# Patient Record
Sex: MALE | Race: WHITE | Employment: FULL TIME | URBAN - METROPOLITAN AREA
[De-identification: names, ages, dates, MRNs, and addresses within clinical notes are randomized per-mention and may not be internally consistent; named-entity substitution may affect disease eponyms.]

---

## 2017-01-05 ENCOUNTER — OFFICE VISIT (OUTPATIENT)
Dept: NEPHROLOGY | Facility: CLINIC | Age: 42
End: 2017-01-05

## 2017-01-05 VITALS
WEIGHT: 190.38 LBS | HEART RATE: 78 BPM | SYSTOLIC BLOOD PRESSURE: 120 MMHG | HEIGHT: 71 IN | BODY MASS INDEX: 26.65 KG/M2 | DIASTOLIC BLOOD PRESSURE: 79 MMHG

## 2017-01-05 DIAGNOSIS — Z00.00 HEALTHCARE MAINTENANCE: Primary | ICD-10-CM

## 2017-01-05 PROCEDURE — 99396 PREV VISIT EST AGE 40-64: CPT | Performed by: INTERNAL MEDICINE

## 2017-01-05 RX ORDER — TADALAFIL 20 MG/1
20 TABLET ORAL
Qty: 8 TABLET | Refills: 3 | Status: SHIPPED | OUTPATIENT
Start: 2017-01-05

## 2017-01-05 RX ORDER — CITALOPRAM 20 MG/1
20 TABLET ORAL
Qty: 90 TABLET | Refills: 3 | Status: SHIPPED | OUTPATIENT
Start: 2017-01-05 | End: 2018-01-29

## 2017-01-09 RX ORDER — TADALAFIL 20 MG
TABLET ORAL
Qty: 8 TABLET | Refills: 0 | Status: SHIPPED | OUTPATIENT
Start: 2017-01-09 | End: 2017-03-10

## 2017-01-09 NOTE — PROGRESS NOTES
NEPHROLOGY PROGRESS NOTE  Ewa Gutierrez    MRN:  [id number]   Date of Service:  1/5/17     HISTORY OF PRESENT ILLNESS: The patient is here. He is a [employer]IN[employer]  in [place] [address]. He is currently doing fine.  His depression has gotten better with citalop

## 2017-01-13 ENCOUNTER — TELEPHONE (OUTPATIENT)
Dept: NEPHROLOGY | Facility: CLINIC | Age: 42
End: 2017-01-13

## 2017-01-13 PROBLEM — N52.9 ERECTILE DYSFUNCTION: Status: ACTIVE | Noted: 2017-01-13

## 2017-01-13 NOTE — TELEPHONE ENCOUNTER
Spoke to patient and was informed that cialis prescription has been rejected at [facility]. [address] [place] [address] desk at [phone] to initiate a plan exclusion form (as of 1/1/17 Cialis is not covered.  Forms are being faxed to the office and a amos

## 2017-01-19 ENCOUNTER — TELEPHONE (OUTPATIENT)
Dept: NEPHROLOGY | Facility: CLINIC | Age: 42
End: 2017-01-19

## 2017-01-23 ENCOUNTER — TELEPHONE (OUTPATIENT)
Dept: NEPHROLOGY | Facility: CLINIC | Age: 42
End: 2017-01-23

## 2017-01-24 NOTE — TELEPHONE ENCOUNTER
Patient contacted and advised that his company cancelled benefits for any of these medications and patient would need to pay out of pocket if he wants to use them.

## 2017-03-04 ENCOUNTER — PATIENT MESSAGE (OUTPATIENT)
Dept: NEPHROLOGY | Facility: CLINIC | Age: 42
End: 2017-03-04

## 2017-03-06 NOTE — TELEPHONE ENCOUNTER
From: Xiomara Soliz  To: Talib Alfonso MD  Sent: 3/4/2017 11:45 AM CST  Subject: Prescription Question    Hi Dr. Sukhwinder Edwards I finally got onto MyChart.  My wonderful insurance company decided to no longer cover Cialis for ED as a result of my other medication

## 2017-03-08 RX ORDER — TADALAFIL 20 MG/1
20 TABLET ORAL
Qty: 30 TABLET | Refills: 11 | Status: SHIPPED | OUTPATIENT
Start: 2017-03-08 | End: 2017-03-09

## 2017-03-09 ENCOUNTER — TELEPHONE (OUTPATIENT)
Dept: NEPHROLOGY | Facility: CLINIC | Age: 42
End: 2017-03-09

## 2017-03-09 RX ORDER — TADALAFIL 20 MG/1
20 TABLET ORAL
Qty: 30 TABLET | Refills: 11 | Status: SHIPPED | OUTPATIENT
Start: 2017-03-09

## 2017-03-10 ENCOUNTER — OFFICE VISIT (OUTPATIENT)
Dept: OTOLARYNGOLOGY | Facility: CLINIC | Age: 42
End: 2017-03-10

## 2017-03-10 VITALS
DIASTOLIC BLOOD PRESSURE: 82 MMHG | BODY MASS INDEX: 24.5 KG/M2 | SYSTOLIC BLOOD PRESSURE: 125 MMHG | HEIGHT: 71 IN | TEMPERATURE: 98 F | WEIGHT: 175 LBS

## 2017-03-10 DIAGNOSIS — H61.21 IMPACTED CERUMEN OF RIGHT EAR: Primary | ICD-10-CM

## 2017-03-10 PROCEDURE — 69210 REMOVE IMPACTED EAR WAX UNI: CPT | Performed by: OTOLARYNGOLOGY

## 2017-03-10 PROCEDURE — 99243 OFF/OP CNSLTJ NEW/EST LOW 30: CPT | Performed by: OTOLARYNGOLOGY

## 2017-03-11 NOTE — PATIENT INSTRUCTIONS
Earwax Removal    The ear canal makes earwax from the canal’s lining. The ears make wax to lubricate and protect the ear canal. The ear canal is the tube that connects the middle ear to the outside of the ear.  The wax protects the ear from bacteria, infe · Don’t use cotton swabs in your ears. Cotton swabs may push wax deeper into the ear canal or damage the eardrum.  Use cotton gauze or a wet washcloth  to gently remove wax on the outside of the ear and around the opening to the ear canal.  · Don't use any © 9041-4870 [address], [address] [place]. All rights reserved. This information is not intended as a substitute for professional medical care. Always follow your healthcare professional's instructions.

## 2017-03-11 NOTE — PROGRESS NOTES
Xiomara Soliz is a 40year old male. Patient presents with:  Ear Wax: bilateral ear cleaning     HPI:   He's had an unusual feeling in his right ear with some blockage and occasional pain.  He has no history of ear infection, surgery, injury, and was exposur Appropriate mood and affect. Lymph Detail Normal Submental. Submandibular. Anterior cervical. Posterior cervical. Supraclavicular.    Eyes Normal Conjunctiva - Right: Normal, Left: Normal. Pupil - Right: Normal, Left: Normal.    Ears Normal Inspection - R

## 2017-03-20 RX ORDER — CITALOPRAM 20 MG/1
TABLET ORAL
Qty: 90 TABLET | Refills: 1 | Status: SHIPPED | OUTPATIENT
Start: 2017-03-20 | End: 2020-03-11

## 2018-01-29 RX ORDER — CITALOPRAM 20 MG/1
TABLET ORAL
Qty: 30 TABLET | Refills: 0 | Status: SHIPPED | OUTPATIENT
Start: 2018-01-29 | End: 2018-04-02

## 2018-04-03 RX ORDER — CITALOPRAM 20 MG/1
TABLET ORAL
Qty: 30 TABLET | Refills: 0 | Status: SHIPPED | OUTPATIENT
Start: 2018-04-03 | End: 2018-06-02

## 2018-06-04 RX ORDER — CITALOPRAM 20 MG/1
TABLET ORAL
Qty: 30 TABLET | Refills: 5 | Status: SHIPPED | OUTPATIENT
Start: 2018-06-04 | End: 2019-03-28

## 2019-03-29 RX ORDER — CITALOPRAM 20 MG/1
TABLET ORAL
Qty: 30 TABLET | Refills: 0 | Status: SHIPPED | OUTPATIENT
Start: 2019-03-29 | End: 2019-05-10

## 2019-05-10 RX ORDER — CITALOPRAM 20 MG/1
TABLET ORAL
Qty: 30 TABLET | Refills: 5 | Status: SHIPPED | OUTPATIENT
Start: 2019-05-10 | End: 2020-03-11

## 2020-03-11 ENCOUNTER — PATIENT MESSAGE (OUTPATIENT)
Dept: NEPHROLOGY | Facility: CLINIC | Age: 45
End: 2020-03-11

## 2020-03-11 RX ORDER — CITALOPRAM 20 MG/1
20 TABLET ORAL DAILY
Qty: 90 TABLET | Refills: 1 | OUTPATIENT
Start: 2020-03-11

## 2020-03-11 RX ORDER — CITALOPRAM 20 MG/1
20 TABLET ORAL DAILY
Qty: 30 TABLET | Refills: 0 | Status: SHIPPED | OUTPATIENT
Start: 2020-03-11 | End: 2020-04-16

## 2020-03-11 RX ORDER — CITALOPRAM 20 MG/1
20 TABLET ORAL DAILY
Qty: 30 TABLET | Refills: 0 | OUTPATIENT
Start: 2020-03-11

## 2020-03-11 NOTE — TELEPHONE ENCOUNTER
From: Alba Chavarria  To: Ailin Byrd MD  Sent: 3/11/2020 3:46 PM CDT  Subject: Prescription Question    Hi Dr Alethea Johnson cannot re-fill my prescription it says because my Dr's office needs to approve my re-fill.  Its for citalorpram (no clue if

## 2020-04-16 RX ORDER — CITALOPRAM 20 MG/1
20 TABLET ORAL DAILY
Qty: 30 TABLET | Refills: 5 | Status: SHIPPED | OUTPATIENT
Start: 2020-04-16 | End: 2020-12-24

## 2020-04-16 NOTE — TELEPHONE ENCOUNTER
Pharmacy called for refill:     Current Outpatient Medications:   •  Citalopram Hydrobromide 20 MG Oral Tab, Take 1 tablet (20 mg total) by mouth daily. , Disp: 30 tablet, Rfl: 0    Please call.

## 2020-12-24 RX ORDER — CITALOPRAM 20 MG/1
20 TABLET ORAL DAILY
Qty: 30 TABLET | Refills: 5 | Status: SHIPPED | OUTPATIENT
Start: 2020-12-24 | End: 2021-07-28

## 2020-12-24 NOTE — TELEPHONE ENCOUNTER
.  Current Outpatient Medications:   •  Citalopram Hydrobromide 20 MG Oral Tab, Take 1 tablet (20 mg total) by mouth daily. , Disp: 30 tablet, Rfl: 5

## 2021-07-28 ENCOUNTER — TELEPHONE (OUTPATIENT)
Dept: NEPHROLOGY | Facility: CLINIC | Age: 46
End: 2021-07-28

## 2021-07-28 RX ORDER — CITALOPRAM 20 MG/1
20 TABLET ORAL DAILY
Qty: 30 TABLET | Refills: 2 | Status: SHIPPED | OUTPATIENT
Start: 2021-07-28 | End: 2021-12-01

## 2021-07-28 NOTE — TELEPHONE ENCOUNTER
Current Outpatient Medications   Medication Sig Dispense Refill   • Citalopram Hydrobromide 20 MG Oral Tab Take 1 tablet (20 mg total) by mouth daily.  30 tablet 5

## 2021-10-01 ENCOUNTER — TELEPHONE (OUTPATIENT)
Dept: NEPHROLOGY | Facility: CLINIC | Age: 46
End: 2021-10-01

## 2021-10-01 NOTE — TELEPHONE ENCOUNTER
How can I speak to the mother of a 71-year-old patient?   Where his this release of information been granted

## 2021-10-01 NOTE — TELEPHONE ENCOUNTER
Mother requesting to speak with Dr. Bay Lau regarding pt. No further details given.  Please call [phone]

## 2021-10-07 ENCOUNTER — TELEPHONE (OUTPATIENT)
Dept: NEPHROLOGY | Facility: CLINIC | Age: 46
End: 2021-10-07

## 2021-10-08 NOTE — TELEPHONE ENCOUNTER
Spoke with pt and advised him that the appointment will be on 11/11 at 640p and  not Nov 4, patient states that appointment date and time works perfect.

## 2021-10-08 NOTE — PROGRESS NOTES
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   He is depressed about that she is on medical leave he has been taking 10/7/2021) 8 tablet 3       Allergies:  No Known Allergies      ROS:     Constitutional:  Negative for decreased activity, fever, irritability and lethargy  ENMT:  Negative for ear drainage, hearing loss and nasal drainage  Eyes:  Negative for eye discharg stop the medicine go to the ER if any rash looks like a red sunburn develops see me back in 4 weeks    #2 sleep problems try Klonopin or melatonin    #3 ED try Viagra between 40 and 100 mg daily on an empty stomach as needed we will call [facility]    #4

## 2021-10-08 NOTE — PATIENT INSTRUCTIONS
Continue citalopram    Get flu shot if desired        Call [facility] take Viagra between 2 and 5 pills 1 hour before sex on empty stomach do not eat for 1 hour after taking    Add lamotrigine 1 pill daily in the morning for 1 week and 2 pills daily for

## 2021-10-14 RX ORDER — LAMOTRIGINE 100 MG/1
100 TABLET ORAL DAILY
Qty: 30 TABLET | Refills: 0 | Status: SHIPPED | OUTPATIENT
Start: 2021-10-14

## 2021-10-14 NOTE — TELEPHONE ENCOUNTER
Rx sent to [facility]. Pt requesting refill on lamotrigine 100mg daily. Rx pended if appropriate, thank you!

## 2021-11-05 ENCOUNTER — LAB ENCOUNTER (OUTPATIENT)
Dept: LAB | Facility: HOSPITAL | Age: 46
End: 2021-11-05
Attending: INTERNAL MEDICINE
Payer: COMMERCIAL

## 2021-11-05 DIAGNOSIS — Z12.5 PROSTATE CANCER SCREENING: ICD-10-CM

## 2021-11-05 DIAGNOSIS — Z00.00 ROUTINE ADULT HEALTH MAINTENANCE: ICD-10-CM

## 2021-11-05 DIAGNOSIS — F32.1 CURRENT MODERATE EPISODE OF MAJOR DEPRESSIVE DISORDER WITHOUT PRIOR EPISODE (HCC): ICD-10-CM

## 2021-11-05 PROCEDURE — 85025 COMPLETE CBC W/AUTO DIFF WBC: CPT

## 2021-11-05 PROCEDURE — 80061 LIPID PANEL: CPT

## 2021-11-05 PROCEDURE — 36415 COLL VENOUS BLD VENIPUNCTURE: CPT

## 2021-11-05 PROCEDURE — 81003 URINALYSIS AUTO W/O SCOPE: CPT

## 2021-11-05 PROCEDURE — 84443 ASSAY THYROID STIM HORMONE: CPT

## 2021-11-05 PROCEDURE — 80053 COMPREHEN METABOLIC PANEL: CPT

## 2021-11-08 RX ORDER — LAMOTRIGINE 25 MG/1
TABLET ORAL
Qty: 100 TABLET | Refills: 0 | OUTPATIENT
Start: 2021-11-08

## 2021-11-08 NOTE — TELEPHONE ENCOUNTER
LOV 10/07/21  F/U appt made for 11/11    Lamictal was increased to 100mg daily. Can you please verify the dose and quantity. Thank you!

## 2021-11-12 NOTE — PROGRESS NOTES
Progress Note     Mary Martinez    Is feeling good the Lamictal seeming to help him is up to 100 mg/day did recommend he see a psychiatrist [facility] is otherwise feeling fine      HISTORY:  Past Medical History:   Diagnosis Date constipation  Genitourinary:  Negative for dysuria and hematuria  Endocrine:  Negative for abnormal sleep patterns  Hema/Lymph:  Negative for easy bleeding and easy bruising  Integumentary:  Negative for pruritus and rash  Musculoskeletal:  Negative for harry

## 2021-11-12 NOTE — PATIENT INSTRUCTIONS
Take your Lamictal 100 mg/day    Please see a psychiatrist as some about your concentration and all your medications    Labs are good  Cynthea Ronel nice job      Stay well see me back in 3 months

## 2021-12-01 ENCOUNTER — TELEPHONE (OUTPATIENT)
Dept: NEPHROLOGY | Facility: CLINIC | Age: 46
End: 2021-12-01

## 2021-12-01 NOTE — TELEPHONE ENCOUNTER
Patient contacted [facility] regarding script for rx citalopram. Patient calling for update, please call at [phone],[facility].

## 2021-12-02 ENCOUNTER — PATIENT MESSAGE (OUTPATIENT)
Dept: NEPHROLOGY | Facility: CLINIC | Age: 46
End: 2021-12-02

## 2021-12-02 RX ORDER — CITALOPRAM 20 MG/1
20 TABLET ORAL DAILY
Qty: 30 TABLET | Refills: 5 | Status: SHIPPED | OUTPATIENT
Start: 2021-12-02

## 2021-12-02 NOTE — TELEPHONE ENCOUNTER
From: Nga Menendez  To: Angela Littlejohn MD  Sent: 12/2/2021 1:03 PM CST  Subject: Meds    Hello can someone PLEASE have my medication refilled approved?  [facility] sent in the request on Monday and has not gotten a response back I called yesterday Nabor

## 2021-12-02 NOTE — TELEPHONE ENCOUNTER
Pt called in to follow up on refill he states it is urgent.  Pt is expecting the medication today if possible Please follow up

## 2022-02-21 NOTE — PATIENT INSTRUCTIONS
Same meds     refills      See me august    Keep seeing counsellor    Call me if problems vikki    Good job w your health. Stewart Diaz

## 2022-02-23 ENCOUNTER — TELEPHONE (OUTPATIENT)
Dept: NEPHROLOGY | Facility: CLINIC | Age: 47
End: 2022-02-23

## 2022-02-24 RX ORDER — CITALOPRAM 20 MG/1
20 TABLET ORAL DAILY
Qty: 30 TABLET | Refills: 5 | Status: SHIPPED | OUTPATIENT
Start: 2022-02-24

## 2022-02-24 RX ORDER — LAMOTRIGINE 100 MG/1
100 TABLET ORAL DAILY
Qty: 90 TABLET | Refills: 1 | Status: SHIPPED | OUTPATIENT
Start: 2022-02-24

## 2022-02-24 NOTE — TELEPHONE ENCOUNTER
Patient requesting to speak with nurse regarding medication refills he is waiting on. Please call at [phone],QVRJVI.

## 2022-06-06 ENCOUNTER — PATIENT MESSAGE (OUTPATIENT)
Dept: NEPHROLOGY | Facility: CLINIC | Age: 47
End: 2022-06-06

## 2022-06-20 NOTE — TELEPHONE ENCOUNTER
From: Amarjit Hernandez  To: Prashant Bolivar MD  Sent: 6/6/2022 3:51 PM CDT  Subject: Letter for work    Hi Dr Rayna Bolivar, I was just told by [employer] that I need a letter from you in order for me to return to work. If you could call me or your nurse I would appreciate it. Thank you!  My cell is [phone]     [facility] Staff

## 2022-06-20 NOTE — TELEPHONE ENCOUNTER
Dr. Juan Ramon Olvera, patient was last seen 2/21 with no mention of being off work. Would you like to see patient for work release?

## 2022-07-12 ENCOUNTER — TELEPHONE (OUTPATIENT)
Dept: NEPHROLOGY | Facility: CLINIC | Age: 47
End: 2022-07-12

## 2022-07-12 NOTE — TELEPHONE ENCOUNTER
LMTCB  To ask pt if still taking medication Citalopram regularly since we received a note from [facility] that the review of mail service prescription history  indicates that pt may have stopped using the medication. Please fax to [facility] for any update #[phone].

## 2022-07-12 NOTE — TELEPHONE ENCOUNTER
Received a call from pt stated still taking the medication Citalopram and provided [phone] to contact [address] faxed note to [facility] #[phone].

## 2022-09-28 RX ORDER — LAMOTRIGINE 100 MG/1
100 TABLET ORAL DAILY
Qty: 90 TABLET | Refills: 1 | Status: SHIPPED | OUTPATIENT
Start: 2022-09-28

## 2023-02-09 RX ORDER — CITALOPRAM 20 MG/1
20 TABLET ORAL DAILY
Qty: 30 TABLET | Refills: 5 | Status: SHIPPED | OUTPATIENT
Start: 2023-02-09

## 2023-05-17 ENCOUNTER — TELEPHONE (OUTPATIENT)
Dept: NEPHROLOGY | Facility: CLINIC | Age: 48
End: 2023-05-17

## 2023-05-17 RX ORDER — LAMOTRIGINE 100 MG/1
100 TABLET ORAL DAILY
Qty: 90 TABLET | Refills: 0 | Status: SHIPPED | OUTPATIENT
Start: 2023-05-17

## 2023-05-17 NOTE — TELEPHONE ENCOUNTER
LOV: 2-21-22  RTC: \"see me in august\"  Scheduled pt for 9-20-23 (was the only next available other than reserved dates)    RN refilled RX per protocol while MD is out of office.

## 2023-08-29 RX ORDER — CITALOPRAM 20 MG/1
20 TABLET ORAL DAILY
Qty: 90 TABLET | Refills: 1 | Status: SHIPPED | OUTPATIENT
Start: 2023-08-29

## 2023-09-20 ENCOUNTER — OFFICE VISIT (OUTPATIENT)
Dept: NEPHROLOGY | Facility: CLINIC | Age: 48
End: 2023-09-20

## 2023-09-20 VITALS
SYSTOLIC BLOOD PRESSURE: 119 MMHG | HEART RATE: 67 BPM | BODY MASS INDEX: 27.2 KG/M2 | DIASTOLIC BLOOD PRESSURE: 77 MMHG | WEIGHT: 190 LBS | HEIGHT: 70 IN

## 2023-09-20 DIAGNOSIS — Z00.00 ROUTINE ADULT HEALTH MAINTENANCE: ICD-10-CM

## 2023-09-20 DIAGNOSIS — F34.1 DYSTHYMIA: ICD-10-CM

## 2023-09-20 DIAGNOSIS — E78.9 DISORDER OF LIPID METABOLISM: Primary | ICD-10-CM

## 2023-09-20 DIAGNOSIS — Z12.5 PROSTATE CANCER SCREENING: ICD-10-CM

## 2023-09-20 PROCEDURE — 99396 PREV VISIT EST AGE 40-64: CPT | Performed by: INTERNAL MEDICINE

## 2023-09-20 PROCEDURE — 3008F BODY MASS INDEX DOCD: CPT | Performed by: INTERNAL MEDICINE

## 2023-09-20 PROCEDURE — 3078F DIAST BP <80 MM HG: CPT | Performed by: INTERNAL MEDICINE

## 2023-09-20 PROCEDURE — 3074F SYST BP LT 130 MM HG: CPT | Performed by: INTERNAL MEDICINE

## 2023-09-21 NOTE — PROGRESS NOTES
Progress Note     Amarjit Hernandez    Is here is doing well he stopped his Lamictal currently just on citalopram but feels well does need to see the eye doctor get a flu shot COVID shot father passed away in December he is still mourning him otherwise doing okay family is doing well no skin lesions urinating fine bowels are fine does need to schedule colonoscopy      HISTORY:  Past Medical History:   Diagnosis Date    Elbow fracture, right 2004      Past Surgical History:   Procedure Laterality Date    HERNIA SURGERY        Social History    Tobacco Use      Smoking status: Never      Smokeless tobacco: Never    Alcohol use: No        Medications (Active prior to today's visit):  Current Outpatient Medications   Medication Sig Dispense Refill    citalopram 20 MG Oral Tab Take 1 tablet (20 mg total) by mouth daily. 90 tablet 1    sildenafil 20 MG Oral Tab 2 to 5 pills 1 hour before sex as needed 50 tablet 3    clonazePAM 1 MG Oral Tab Take 1 tablet (1 mg total) by mouth nightly as needed for Anxiety. (Patient taking differently: Take 1 tablet (1 mg total) by mouth nightly as needed for Anxiety. PRN) 30 tablet 1    lamoTRIgine 100 MG Oral Tab Take 1 tablet (100 mg total) by mouth daily.  (Patient not taking: Reported on 9/20/2023) 90 tablet 0       Allergies:  No Known Allergies      ROS:     Constitutional:  Negative for decreased activity, fever, irritability and lethargy  ENMT:  Negative for ear drainage, hearing loss and nasal drainage  Eyes:  Negative for eye discharge and vision loss  Cardiovascular:  Negative for chest pain, sob  Respiratory:  Negative for cough, dyspnea and wheezing  Gastrointestinal:  Negative for abdominal pain, constipation  Genitourinary:  Negative for dysuria and hematuria  Endocrine:  Negative for abnormal sleep patterns  Hema/Lymph:  Negative for easy bleeding and easy bruising  Integumentary:  Negative for pruritus and rash  Musculoskeletal:  Negative for bone/joint symptoms  Neurological:  Negative for gait disturbance  Psychiatric:  Negative for inappropriate interaction and psychiatric symptoms       09/20/23  1614   BP: 119/77   Pulse: 67       PHYSICAL EXAM:   Constitutional: appears well hydrated alert and responsive   Head/Face: normocephalic  Eyes/Vision: normal extraocular motion is intact  Nose/Mouth/Throat:mucous membranes are moist   Neck/Thyroid: neck is supple without adenopathy  Lymphatic: no abnormal cervical, supraclavicular adenopathy is noted  Respiratory:  lungs are clear to auscultation bilaterally  Cardiovascular: regular rate and rhythm   Abdomen: soft, non-tender, non-distended, BS normal  Skin/Hair: no unusual rashes present, no abnormal bruising noted  Back/Spine: no abnormalities noted  Musculoskeletal: no deformities  Extremities: no edema  Neurological:  Grossly normal  Skin clear     ASSESSMENT/PLAN:   Assessment   Disorder of lipid metabolism  (primary encounter diagnosis)  Prostate cancer screening  Dysthymia  Routine adult health maintenance    #1 health maintenance COVID shot and flu shot this fall schedule colonoscopy continue exercise diet get a full set of labs call with results    #2 depression continue citalopram he is back to work  3 erectile dysfunction Viagra as needed       See me one year  will call w labs results  Orders This Visit:  Orders Placed This Encounter      CBC With Differential With Platelet      Comp Metabolic Panel (14)      Hemoglobin A1C      Lipid Panel      Urinalysis, Routine      Assay, Thyroid Stim Hormone      PSA Total, Screen      Testosterone,Total and Weakly Bound w/ SHBG      Meds This Visit:  Requested Prescriptions      No prescriptions requested or ordered in this encounter       Imaging & Referrals:  None     9/20/2023  Sreedhar Gutierrez MD

## 2023-11-02 ENCOUNTER — LAB ENCOUNTER (OUTPATIENT)
Dept: LAB | Age: 48
End: 2023-11-02
Attending: INTERNAL MEDICINE
Payer: COMMERCIAL

## 2023-11-02 DIAGNOSIS — Z12.5 PROSTATE CANCER SCREENING: ICD-10-CM

## 2023-11-02 DIAGNOSIS — E78.9 DISORDER OF LIPID METABOLISM: ICD-10-CM

## 2023-11-02 LAB
ALBUMIN SERPL-MCNC: 4.3 G/DL (ref 3.2–4.8)
ALBUMIN/GLOB SERPL: 1.4 {RATIO} (ref 1–2)
ALP LIVER SERPL-CCNC: 58 U/L
ALT SERPL-CCNC: 33 U/L
ANION GAP SERPL CALC-SCNC: 10 MMOL/L (ref 0–18)
AST SERPL-CCNC: 21 U/L (ref ?–34)
BASOPHILS # BLD AUTO: 0.04 X10(3) UL (ref 0–0.2)
BASOPHILS NFR BLD AUTO: 0.7 %
BILIRUB SERPL-MCNC: 0.7 MG/DL (ref 0.3–1.2)
BILIRUB UR QL: NEGATIVE
BUN BLD-MCNC: 10 MG/DL (ref 9–23)
BUN/CREAT SERPL: 9.7 (ref 10–20)
CALCIUM BLD-MCNC: 9.7 MG/DL (ref 8.7–10.4)
CHLORIDE SERPL-SCNC: 103 MMOL/L (ref 98–112)
CHOLEST SERPL-MCNC: 200 MG/DL (ref ?–200)
CLARITY UR: CLEAR
CO2 SERPL-SCNC: 27 MMOL/L (ref 21–32)
COLOR UR: YELLOW
CREAT BLD-MCNC: 1.03 MG/DL
DEPRECATED RDW RBC AUTO: 41.6 FL (ref 35.1–46.3)
EGFRCR SERPLBLD CKD-EPI 2021: 90 ML/MIN/1.73M2 (ref 60–?)
EOSINOPHIL # BLD AUTO: 0.14 X10(3) UL (ref 0–0.7)
EOSINOPHIL NFR BLD AUTO: 2.3 %
ERYTHROCYTE [DISTWIDTH] IN BLOOD BY AUTOMATED COUNT: 12.8 % (ref 11–15)
EST. AVERAGE GLUCOSE BLD GHB EST-MCNC: 126 MG/DL (ref 68–126)
FASTING PATIENT LIPID ANSWER: YES
FASTING STATUS PATIENT QL REPORTED: YES
GLOBULIN PLAS-MCNC: 3 G/DL (ref 2.8–4.4)
GLUCOSE BLD-MCNC: 97 MG/DL (ref 70–99)
GLUCOSE UR-MCNC: NORMAL MG/DL
HBA1C MFR BLD: 6 % (ref ?–5.7)
HCT VFR BLD AUTO: 44.1 %
HDLC SERPL-MCNC: 52 MG/DL (ref 40–59)
HGB BLD-MCNC: 14.6 G/DL
HGB UR QL STRIP.AUTO: NEGATIVE
IMM GRANULOCYTES # BLD AUTO: 0.02 X10(3) UL (ref 0–1)
IMM GRANULOCYTES NFR BLD: 0.3 %
KETONES UR-MCNC: NEGATIVE MG/DL
LDLC SERPL CALC-MCNC: 128 MG/DL (ref ?–100)
LEUKOCYTE ESTERASE UR QL STRIP.AUTO: NEGATIVE
LYMPHOCYTES # BLD AUTO: 1.82 X10(3) UL (ref 1–4)
LYMPHOCYTES NFR BLD AUTO: 29.6 %
MCH RBC QN AUTO: 29.4 PG (ref 26–34)
MCHC RBC AUTO-ENTMCNC: 33.1 G/DL (ref 31–37)
MCV RBC AUTO: 88.7 FL
MONOCYTES # BLD AUTO: 0.5 X10(3) UL (ref 0.1–1)
MONOCYTES NFR BLD AUTO: 8.1 %
NEUTROPHILS # BLD AUTO: 3.63 X10 (3) UL (ref 1.5–7.7)
NEUTROPHILS # BLD AUTO: 3.63 X10(3) UL (ref 1.5–7.7)
NEUTROPHILS NFR BLD AUTO: 59 %
NITRITE UR QL STRIP.AUTO: NEGATIVE
NONHDLC SERPL-MCNC: 148 MG/DL (ref ?–130)
OSMOLALITY SERPL CALC.SUM OF ELEC: 289 MOSM/KG (ref 275–295)
PH UR: 6.5 [PH] (ref 5–8)
PLATELET # BLD AUTO: 190 10(3)UL (ref 150–450)
POTASSIUM SERPL-SCNC: 4.3 MMOL/L (ref 3.5–5.1)
PROT SERPL-MCNC: 7.3 G/DL (ref 5.7–8.2)
RBC # BLD AUTO: 4.97 X10(6)UL
SODIUM SERPL-SCNC: 140 MMOL/L (ref 136–145)
SP GR UR STRIP: 1.03 (ref 1–1.03)
TRIGL SERPL-MCNC: 114 MG/DL (ref 30–149)
TSI SER-ACNC: 2.85 MIU/ML (ref 0.55–4.78)
UROBILINOGEN UR STRIP-ACNC: NORMAL
VLDLC SERPL CALC-MCNC: 20 MG/DL (ref 0–30)
WBC # BLD AUTO: 6.2 X10(3) UL (ref 4–11)

## 2023-11-02 PROCEDURE — 80053 COMPREHEN METABOLIC PANEL: CPT

## 2023-11-02 PROCEDURE — 81003 URINALYSIS AUTO W/O SCOPE: CPT

## 2023-11-02 PROCEDURE — 84410 TESTOSTERONE BIOAVAILABLE: CPT

## 2023-11-02 PROCEDURE — 83036 HEMOGLOBIN GLYCOSYLATED A1C: CPT

## 2023-11-02 PROCEDURE — 36415 COLL VENOUS BLD VENIPUNCTURE: CPT

## 2023-11-02 PROCEDURE — 80061 LIPID PANEL: CPT

## 2023-11-02 PROCEDURE — 85025 COMPLETE CBC W/AUTO DIFF WBC: CPT

## 2023-11-02 PROCEDURE — 84443 ASSAY THYROID STIM HORMONE: CPT

## 2023-11-04 LAB
COMPLEXED PSA SERPL-MCNC: 0.68 NG/ML
COMPLEXED PSA SERPL-MCNC: 0.9 NG/ML (ref ?–4)

## 2023-11-08 NOTE — H&P
[facility] - Gastroenterology                                                                                                               Reason for consult: cln    Requesting physician or provider: Barrera Hays. Chalo Garcia MD    Chief Complaint   Patient presents with    Colonoscopy Screening     No prior colon; no family hx of colon cancer       HPI:   Dave Alba is a 53year old year-old male without significant PMHx:    he is here today for evaluation prior to cln    he moves his bowels daily and without recent change. he denies straining and/or incomplete evacuation. he denies brbpr and/or melena. he denies acid reflux and/or heartburn. he denies dysphagia, odynophagia and/or globus. he denies abdominal pain. he denies nausea and/or vomiting. he denies recent change in appetite and/or unintentional weight loss.     NSAIDS: prn  Tobacco: no  Alcohol: social  Marijuana: no  Illicit drugs: no    No FH GI malignancy    Never been sedated  No SULY  No anticoagulants  No pacemaker/defibrillator  No pain medications and/or sleep aides    Last colonoscopy: no  Last EGD: no    Wt Readings from Last 6 Encounters:   11/13/23 190 lb (86.2 kg)   09/20/23 190 lb (86.2 kg)   02/21/22 196 lb (88.9 kg)   11/11/21 192 lb (87.1 kg)   10/07/21 192 lb (87.1 kg)   03/10/17 175 lb (79.4 kg)        History, Medications, Allergies, ROS:      Past Medical History:   Diagnosis Date    Elbow fracture, right 2004      Past Surgical History:   Procedure Laterality Date    HERNIA SURGERY        Family Hx:   Family History   Problem Relation Age of Onset    Cancer Father     Cancer Maternal Grandmother     Breast Cancer Maternal Grandmother       Social History:   Social History     Socioeconomic History    Marital status:    Tobacco Use    Smoking status: Never    Smokeless tobacco: Never   Substance and Sexual Activity    Alcohol use: No    Drug use: No   Other Topics Concern    Caffeine Concern Yes     Comment: Soda 8 oz daily        Medications (Active prior to today's visit):  Current Outpatient Medications   Medication Sig Dispense Refill    PEG 3350-KCl-Na Bicarb-NaCl (TRILYTE) 420 g Oral Recon Soln Take prep as directed by gastro office. May substitute with Trilyte/generic equivalent if needed. 4000 mL 0    citalopram 20 MG Oral Tab Take 1 tablet (20 mg total) by mouth daily. 90 tablet 1    sildenafil 20 MG Oral Tab 2 to 5 pills 1 hour before sex as needed 50 tablet 3       Allergies:  No Known Allergies    ROS:   CONSTITUTIONAL: negative for fevers, chills, sweats and weight loss  EYES Negative for red eyes, yellow eyes, changes in vision  HEENT: Negative for dysphagia and hoarseness  RESPIRATORY: Negative for cough and shortness of breath  CARDIOVASCULAR: Negative for chest pain, palpitations  GASTROINTESTINAL: See HPI  GENITOURINARY: Negative for dysuria and frequency  MUSCULOSKELETAL: Negative for arthralgias and myalgias  NEUROLOGICAL: Negative for dizziness and headaches  BEHAVIOR/PSYCH: Negative for anxiety and poor appetite    PHYSICAL EXAM:   Blood pressure 123/80, pulse 86, height 5' 10\" (1.778 m), weight 190 lb (86.2 kg). GEN: WD/WN, NAD  HEENT: Supple symmetrical, trachea midline  CV: RRR, the extremities are warm and well perfused   LUNGS: No increased work of breathing  ABDOMEN: No scars, normal bowel sounds, soft, non-tender, non-distended no rebound or guarding, no masses, no hepatomegaly  MSK: No redness, no warmth, no swelling of joints  SKIN: No jaundice, no erythema, no rashes  HEMATOLOGIC: No bleeding, no bruising  NEURO: Alert and interactive, normal gait    Labs/Imaging/Procedures:     Patient's pertinent labs and imaging were reviewed and discussed with patient today.         .  ASSESSMENT/PLAN:   Amarjit Hernandez is a 53year old year-old male without significant PMHx:    #crc screening  He is here today as a referral from his PCP for evaluation prior to undergoing colonoscopy for CRC screening. He denies red flags such as recent change in bm, brbpr, and/or melena. He denies a FHx GI malignancy. He has not had a colonoscopy and so is now due for CRC screening. We discussed the available screening options for CRC such as FIT, cologuard, and CT colonography as well as efficacy of these modalities. They are electing to pursue cln at this time. 1. Schedule colonoscopy with  or MAC w/ Dr. Jyoti Ashford or Dr. Pal Lowe [Diagnosis: crc screening]    2.  bowel prep from pharmacy (split trilyte)    3. Hold sidenafil 72h prior to procedure    4. Read all bowel prep instructions carefully. Bowel prep instructions can also be found online at:  [email or web address]     5. AVOID seeds, nuts, popcorn, raw fruits and vegetables for 3 days before procedure    6. You MAY need to go for COVID testing 72 hours before procedure. The testing team will call you a few days before your procedure to discuss with you if testing is required. If you are asked to go for COVID testing and do not completed the test, the procedure cannot be performed. 7. If you start any NEW medication after your visit today, please notify us. Certain medications (like iron or weight loss medications) will need to be held before the procedure, or the procedure cannot be performed safely. Orders This Visit:  No orders of the defined types were placed in this encounter. Meds This Visit:  Requested Prescriptions     Signed Prescriptions Disp Refills    PEG 3350-KCl-Na Bicarb-NaCl (TRILYTE) 420 g Oral Recon Soln 4000 mL 0     Sig: Take prep as directed by gastro office. May substitute with Trilyte/generic equivalent if needed. Imaging & Referrals:  None    ENDOSCOPIC RISK BENEFIT DISCUSSION: I described the procedure in great detail with the patient. I discussed the risks and benefits, including but not limited to: bleeding, perforation, infection, anesthesia complications, and even death.  Patient will be NPO after midnight and will have a person physically present at time of pick-up to drive patient home. Patient verbalized understanding and agrees to proceed with procedure as planned. Arturo Damian. Michael Henson, APRN   11/13/2023        This note was partially prepared using Orange[employer]ly[employer] voice recognition dictation software. As a result, errors may occur. When identified, these errors have been corrected.  While every attempt is made to correct errors during dictation, discrepancies may still exist.

## 2023-11-09 LAB
SEX HORM BIND GLOB: 20.8 NMOL/L
TESTOST % FREE+WEAK BND: 34.7 %
TESTOST FREE+WEAK BND: 95.3 NG/DL
TESTOSTERONE TOT /MS: 274.5 NG/DL

## 2023-11-13 ENCOUNTER — TELEPHONE (OUTPATIENT)
Facility: CLINIC | Age: 48
End: 2023-11-13

## 2023-11-13 ENCOUNTER — OFFICE VISIT (OUTPATIENT)
Facility: CLINIC | Age: 48
End: 2023-11-13
Payer: COMMERCIAL

## 2023-11-13 VITALS
WEIGHT: 190 LBS | HEIGHT: 70 IN | DIASTOLIC BLOOD PRESSURE: 80 MMHG | SYSTOLIC BLOOD PRESSURE: 123 MMHG | HEART RATE: 86 BPM | BODY MASS INDEX: 27.2 KG/M2

## 2023-11-13 DIAGNOSIS — Z12.11 SCREEN FOR COLON CANCER: Primary | ICD-10-CM

## 2023-11-13 DIAGNOSIS — Z12.11 COLON CANCER SCREENING: Primary | ICD-10-CM

## 2023-11-13 RX ORDER — POLYETHYLENE GLYCOL 3350, SODIUM CHLORIDE, SODIUM BICARBONATE, POTASSIUM CHLORIDE 420; 11.2; 5.72; 1.48 G/4L; G/4L; G/4L; G/4L
POWDER, FOR SOLUTION ORAL
Qty: 4000 ML | Refills: 0 | Status: SHIPPED | OUTPATIENT
Start: 2023-11-13

## 2023-11-13 NOTE — PATIENT INSTRUCTIONS
1. Schedule colonoscopy with  or MAC w/ Dr. Sree Mcmahon or Dr. Naga Stone [Diagnosis: crc screening]    2.  bowel prep from pharmacy (split trilyte)    3. Hold sidenafil 72h prior to procedure    4. Read all bowel prep instructions carefully. Bowel prep instructions can also be found online at:  [email or web address]     5. AVOID seeds, nuts, popcorn, raw fruits and vegetables for 3 days before procedure    6. You MAY need to go for COVID testing 72 hours before procedure. The testing team will call you a few days before your procedure to discuss with you if testing is required. If you are asked to go for COVID testing and do not completed the test, the procedure cannot be performed. 7. If you start any NEW medication after your visit today, please notify us. Certain medications (like iron or weight loss medications) will need to be held before the procedure, or the procedure cannot be performed safely.

## 2023-11-13 NOTE — TELEPHONE ENCOUNTER
Scheduled for:  Colonoscopy [id number]  Provider Name:  Dr. Pal Lowe  Date:  2/20/2024  Location:  [facility]  Sedation:  MAC  Time:  9:00 am, (pt is aware that [facility] will call the day before to confirm arrival time)  Prep:  Trilyte  Meds/Allergies Reconciled?:  Layne/MARIUSZ reviewed  Diagnosis with codes:  Screening for colon cancer Z12.11  Was patient informed to call insurance with codes (Y/N):  Yes  Referral sent?:  Referral was sent at the time of electronic surgical scheduling. [facility] or [address] notified?:  I sent an electronic request to Endo Scheduling and received a confirmation today. Medication Orders:  Pt is aware to NOT take iron pills, herbal meds and diet supplements for 7 days before exam. Also to NOT take any form of alcohol, recreational drugs and any forms of ED meds 24 hours before exam.   Misc Orders:  N/A     Further instructions given by staff:  Prep instructions were given to pt in the office, pt verbalized understanding.

## 2024-02-20 PROCEDURE — 88305 TISSUE EXAM BY PATHOLOGIST: CPT | Performed by: INTERNAL MEDICINE

## 2024-03-18 RX ORDER — CITALOPRAM 20 MG/1
20 TABLET ORAL DAILY
Qty: 90 TABLET | Refills: 1 | Status: SHIPPED | OUTPATIENT
Start: 2024-03-18

## 2024-03-20 NOTE — TELEPHONE ENCOUNTER
Discharge instructions reviewed and signed, all questions answered, PIV removed by floor, awaiting Meds to Beds.   Ok to write letter

## 2024-08-09 NOTE — PATIENT INSTRUCTIONS
Good job vikki Tompkins meds    Flu shot next few weeks    COVID-vaccine in December    Please do labs fasting 12 hours I will call with results    Please see eye doctor    Please see [facility]ancock for colon screening the girl at the desk will schedule this for you    See me back next summer    Glad you are doing better
career/technical training

## 2024-09-13 DIAGNOSIS — N52.39 POST-PROCEDURAL ERECTILE DYSFUNCTION, UNSPECIFIED TYPE: Primary | ICD-10-CM

## 2024-09-17 NOTE — TELEPHONE ENCOUNTER
LOV-9/20/23  RTC-next summer  Follow -no appt yet  Left a message to call back need follow up appt.

## 2024-09-25 ENCOUNTER — PATIENT MESSAGE (OUTPATIENT)
Dept: NEPHROLOGY | Facility: CLINIC | Age: 49
End: 2024-09-25

## 2024-09-25 RX ORDER — SILDENAFIL CITRATE 20 MG/1
TABLET ORAL
Qty: 50 TABLET | Refills: 3 | Status: CANCELLED | OUTPATIENT
Start: 2024-09-25

## 2024-09-25 NOTE — TELEPHONE ENCOUNTER
From: Sandeep Arvizu  To: Sreedhar Rubio  Sent: 9/25/2024  2:17 PM CDT  Subject: Shawnee Trujillo, this message is for Shawnee trying to return your phone call however, I can’t get through because it goes to the general number. If you could call me back on my cell phone [phone].

## 2024-09-25 NOTE — TELEPHONE ENCOUNTER
From: Sandeep Arvizu  To: Sreedhar Rubio  Sent: 9/25/2024 2:17 PM CDT  Subject: Shawnee Trujillo, this message is for Shawnee trying to return your phone call however, I can’t get through because it goes to the general number. If you could call me back on my cell phone [phone].

## 2024-09-25 NOTE — TELEPHONE ENCOUNTER
Patient returned call.  Please call - patient was offered 10/2/2024 appointment  but patient declined as he will be traveling.  Thank you.

## 2024-10-03 RX ORDER — SILDENAFIL 100 MG/1
100 TABLET, FILM COATED ORAL
Qty: 30 TABLET | Refills: 3 | Status: SHIPPED | OUTPATIENT
Start: 2024-10-03

## 2024-10-03 RX ORDER — CITALOPRAM HYDROBROMIDE 20 MG/1
20 TABLET ORAL DAILY
Qty: 90 TABLET | Refills: 2 | Status: SHIPPED | OUTPATIENT
Start: 2024-10-03

## 2025-03-28 ENCOUNTER — TELEPHONE (OUTPATIENT)
Facility: CLINIC | Age: 50
End: 2025-03-28

## 2025-03-28 NOTE — TELEPHONE ENCOUNTER
Health maintenance updated.    Recall for colonoscopy due 2/2029 entered into patient outreach in Epic.   Please get neuropsych results for me to review and advise pt after I review those I can get back to him on some of the questions he has.

## 2025-07-21 NOTE — TELEPHONE ENCOUNTER
Last office visit:1/15/2025    Return to office:  January/2026     Follow up appointment:  Not  schedule

## 2025-07-22 RX ORDER — CITALOPRAM HYDROBROMIDE 20 MG/1
20 TABLET ORAL DAILY
Qty: 90 TABLET | Refills: 2 | Status: SHIPPED | OUTPATIENT
Start: 2025-07-22

## (undated) NOTE — MR AVS SNAPSHOT
[address]  [phone]               Thank you for choosing us for your health care visit with Parminder Langley MD.  We are glad to serve you and happy to provide you with this summar pain medicine when the provider removes the earwax. A number of conditions lead to earwax buildup. These include some skin problems, a narrow ear canal, or ears that make too much earwax.  Using cotton swabs in the canal pushes earwax deeper into the ear a When to seek medical advice  Call the provider right away if you have:  · Ear pain that gets worse  · Fever of 100.4F°F (38°C) or higher, or as directed by your healthcare provider  · Worsening wax buildup  · Severe pain, dizziness, or nausea  · Bleeding f medications prescribed for you. Read the directions carefully, and ask your doctor or other care provider to review them with you.             MyChart     Visit [facility]  You can access your MyChart to more actively manage your health care and view more deta